# Patient Record
Sex: MALE | Race: WHITE | NOT HISPANIC OR LATINO | Employment: OTHER | URBAN - METROPOLITAN AREA
[De-identification: names, ages, dates, MRNs, and addresses within clinical notes are randomized per-mention and may not be internally consistent; named-entity substitution may affect disease eponyms.]

---

## 2023-11-14 ENCOUNTER — TRANSCRIBE ORDERS (OUTPATIENT)
Dept: ULTRASOUND IMAGING | Facility: HOSPITAL | Age: 78
End: 2023-11-14
Payer: MEDICARE

## 2023-11-14 DIAGNOSIS — E03.9 HYPOTHYROIDISM, UNSPECIFIED TYPE: Primary | ICD-10-CM

## 2023-11-21 ENCOUNTER — HOSPITAL ENCOUNTER (OUTPATIENT)
Dept: ULTRASOUND IMAGING | Facility: HOSPITAL | Age: 78
Discharge: HOME | End: 2023-11-21
Payer: MEDICARE

## 2023-11-21 DIAGNOSIS — E03.9 HYPOTHYROIDISM, UNSPECIFIED TYPE: ICD-10-CM

## 2023-11-21 PROCEDURE — 76536 US EXAM OF HEAD AND NECK: CPT

## 2023-12-12 ENCOUNTER — OFFICE VISIT (OUTPATIENT)
Dept: ORTHOPEDIC SURGERY | Facility: CLINIC | Age: 78
End: 2023-12-12
Payer: MEDICARE

## 2023-12-12 ENCOUNTER — PATIENT ROUNDING (BHMG ONLY) (OUTPATIENT)
Dept: ORTHOPEDIC SURGERY | Facility: CLINIC | Age: 78
End: 2023-12-12
Payer: MEDICARE

## 2023-12-12 VITALS — WEIGHT: 290.3 LBS | TEMPERATURE: 98.6 F | BODY MASS INDEX: 41.56 KG/M2 | HEIGHT: 70 IN

## 2023-12-12 DIAGNOSIS — M17.11 PRIMARY OSTEOARTHRITIS OF RIGHT KNEE: Primary | ICD-10-CM

## 2023-12-12 DIAGNOSIS — E11.9 TYPE 2 DIABETES MELLITUS WITHOUT COMPLICATION, UNSPECIFIED WHETHER LONG TERM INSULIN USE: ICD-10-CM

## 2023-12-12 DIAGNOSIS — M25.561 RIGHT KNEE PAIN, UNSPECIFIED CHRONICITY: ICD-10-CM

## 2023-12-12 RX ORDER — AMLODIPINE BESYLATE AND BENAZEPRIL HYDROCHLORIDE 5; 10 MG/1; MG/1
1 CAPSULE ORAL EVERY MORNING
COMMUNITY
Start: 2023-11-09

## 2023-12-12 RX ORDER — ROSUVASTATIN CALCIUM 10 MG/1
TABLET, COATED ORAL
COMMUNITY
Start: 2023-10-16

## 2023-12-12 RX ORDER — DIPHENOXYLATE HYDROCHLORIDE AND ATROPINE SULFATE 2.5; .025 MG/1; MG/1
TABLET ORAL
COMMUNITY

## 2023-12-12 RX ORDER — LEVOTHYROXINE, LIOTHYRONINE 9.5; 2.25 UG/1; UG/1
1 TABLET ORAL DAILY
COMMUNITY
Start: 2023-11-14

## 2023-12-12 RX ORDER — LIDOCAINE HYDROCHLORIDE 10 MG/ML
2 INJECTION, SOLUTION EPIDURAL; INFILTRATION; INTRACAUDAL; PERINEURAL
Status: COMPLETED | OUTPATIENT
Start: 2023-12-12 | End: 2023-12-12

## 2023-12-12 RX ORDER — MONTELUKAST SODIUM 10 MG/1
10 TABLET ORAL DAILY
COMMUNITY
Start: 2023-11-10

## 2023-12-12 RX ORDER — METHYLPREDNISOLONE ACETATE 80 MG/ML
80 INJECTION, SUSPENSION INTRA-ARTICULAR; INTRALESIONAL; INTRAMUSCULAR; SOFT TISSUE
Status: COMPLETED | OUTPATIENT
Start: 2023-12-12 | End: 2023-12-12

## 2023-12-12 RX ORDER — ACETAMINOPHEN 500 MG
500 TABLET ORAL EVERY 6 HOURS PRN
COMMUNITY

## 2023-12-12 RX ADMIN — LIDOCAINE HYDROCHLORIDE 2 ML: 10 INJECTION, SOLUTION EPIDURAL; INFILTRATION; INTRACAUDAL; PERINEURAL at 11:22

## 2023-12-12 RX ADMIN — METHYLPREDNISOLONE ACETATE 80 MG: 80 INJECTION, SUSPENSION INTRA-ARTICULAR; INTRALESIONAL; INTRAMUSCULAR; SOFT TISSUE at 11:22

## 2023-12-12 NOTE — PROGRESS NOTES
A Performable Message has been sent to the patient for PATIENT ROUNDING with [facility]

## 2023-12-12 NOTE — PROGRESS NOTES
"Patient ID: Fred Grant     Chief Complaint:    Chief Complaint   Patient presents with    Right Knee - Pain, Initial Evaluation        HPI:    Fred Grant is a 78 y.o. who presents today for evaluation of right knee pain.  Patient states 3 weeks ago he was getting out of the car felt he twisted his knee some.  Is having pain medially across the knee and just above and below.  Hurts more with bending.  He is limited on Tylenol and anti-inflammatories he states he is liver disease.    Social History     Socioeconomic History    Marital status:    Tobacco Use    Smoking status: Never     Passive exposure: Never    Smokeless tobacco: Never   Vaping Use    Vaping Use: Never used   Substance and Sexual Activity    Alcohol use: Yes     Alcohol/week: 2.0 standard drinks of alcohol     Types: 2 Glasses of wine per week    Drug use: Never    Sexual activity: Yes     Partners: Female     Birth control/protection: None     Past Medical History:   Diagnosis Date    Hypertension     Type 2 diabetes mellitus      Family History   Problem Relation Age of Onset    Cancer Mother        ROS:    ROS:  Constitutional:  Denies fever, shaking or chills         All other pertinent positives and negatives as noted above in HPI.    Physical Exam:     Vital Signs:  Temp 98.6 °F (37 °C)   Ht 177.8 cm (70\")   Wt 132 kg (290 lb 4.8 oz)   BMI 41.65 kg/m²   Constitutional: Awake alert and oriented x3, well developed, well nourished, no acute distress, non-toxic appearance.    Musculoskeletal:    Exam of the right  knee:  Painful gait with a subtle limp  No muscle atrophy, erythema, ecchymosis, or gross deformity noted  mild knee effusion  + medial> lateral joint line tenderness  Active range of motion 3-115  5/5 strength flexion and extension  The knee is stable to varus and valgus stress testing  Mild varus alignment of the limb  Lachman negative  Posterior drawer negative  Leesa's negative  Patellofemoral grind " +  Sensation grossly intact to light tough throughout the lower extremity  Skin is intact  Distal pulses are 2+  No signs or symptoms of DVT            Diagnostic Studies:     Imaging was personally and individually reviewed and discussed at length with the patient:    4V right knee(s) were taken in the office today, including AP, flexion PA, lateral, and sunrise views to evaluate the patient's complaint:  Weight bearing views show moderate degenerative changes in all three compartments with the medial compartment being most affected.  There is early osteophyte formation throughout all three compartments.  There is no evidence of fracture or dislocation.  No periosteal reactions or medullary lesions are seen.  Patellar height and alignment are within normal limits.     Comparison films not available    AP pelvis was taken in the office today: Mild degenerative changes early bone sclerosis osteophyte formation.            Assessment:     right  Knee Osteoarthritis            Plan:     Based on x-rays, history, and office evaluation, we have diagnosed Fred Grant with knee arthritis. At this time, we recommend starting with a conservative treatment program. This will consist of cortisone injection during significant flare-ups, NSAIDS for daily maintenance, physical therapy for strengthening and modalities as indicated, and bracing when appropriate. These measures will continue, until symptoms are no longer relieved, become more severe or function begins to significantly deteriorate. At that point joint replacement options will be discussed.    Patient will discuss with his primary doctor if he can take any oral anti-inflammatories.  May try some gentle anti-inflammatory medication.  Ice.  Will do an injection today.  Continue to rest and activity modification build back as symptoms improve.  He may also think about trying lateral heel wedge.    Follow up in as needed unless symptoms return or a new issue  occurs.  Patient will call the office to schedule an appointment.     All questions were answered, the patient understands and agrees with the plan.                  Large Joint Arthrocentesis: R knee  Date/Time: 12/12/2023 11:22 AM  Consent given by: patient  Site marked: site marked  Timeout: Immediately prior to procedure a time out was called to verify the correct patient, procedure, equipment, support staff and site/side marked as required   Supporting Documentation  Indications: pain   Procedure Details  Location: knee - R knee  Preparation: Patient was prepped and draped in the usual sterile fashion  Needle gauge: 21 G.  Approach: anterolateral  Medications administered: 2 mL lidocaine PF 1% 1 %; 80 mg methylPREDNISolone acetate 80 MG/ML  Patient tolerance: patient tolerated the procedure well with no immediate complications

## 2024-01-11 ENCOUNTER — OFFICE VISIT (OUTPATIENT)
Dept: SLEEP MEDICINE | Facility: HOSPITAL | Age: 79
End: 2024-01-11
Payer: MEDICARE

## 2024-01-11 VITALS
HEIGHT: 70 IN | OXYGEN SATURATION: 91 % | SYSTOLIC BLOOD PRESSURE: 145 MMHG | HEART RATE: 83 BPM | WEIGHT: 302 LBS | DIASTOLIC BLOOD PRESSURE: 63 MMHG | BODY MASS INDEX: 43.23 KG/M2

## 2024-01-11 DIAGNOSIS — G47.33 OSA TREATED WITH BIPAP: Primary | ICD-10-CM

## 2024-01-11 DIAGNOSIS — E66.01 CLASS 3 SEVERE OBESITY DUE TO EXCESS CALORIES WITHOUT SERIOUS COMORBIDITY WITH BODY MASS INDEX (BMI) OF 40.0 TO 44.9 IN ADULT: ICD-10-CM

## 2024-01-11 PROBLEM — E66.813 CLASS 3 SEVERE OBESITY IN ADULT: Status: ACTIVE | Noted: 2024-01-11

## 2024-01-11 PROCEDURE — G0463 HOSPITAL OUTPT CLINIC VISIT: HCPCS

## 2024-01-11 NOTE — PROGRESS NOTES
[facility]  [address]  [address]  [place] [address]  Phone   Fax       Fred Grant  [id number]   1945  78 y.o.  male      PCP:Janene Daley MD    Type of service: Initial New Patient Office Visit  Date of service: 1/11/2024    Chief Complaint   Patient presents with    Sleep Apnea    Obesity       History of present illness;  Fred Grant 78 y.o.  is a new patient for me and was seen today in event of obstructive sleep apnea.  He has a history of severe sleep apnea and is on BiPAP 17/13.  He had a sleep study in 2009 at [facility].  Unfortunately he has not seen a physician for the past few years.  He is retired.  Without the BIPAP he says he cannot go to sleep.  He also has multiple wakings at night.    I have reviewed the sleep study which was conducted on 15 March  2009 it is a split-night study shows severe sleep apnea with AHI of 58/h and is on a BiPAP of 17/13.    Patient gives the following sleep history.  Sleep schedule:  Bedtime: 10 PM  Wake time: Between 4 and 6 AM  Normally takes about 10-20 minutes to fall asleep        Past medical history: (Relevant to sleep medicine)  Sleep apnea  Hypertension   Diabetes mellitus    Medications are reviewed by me and documented in the encounter  Allergies reviewed and documented in encounter    Social history:  Do you drive a commercial vehicle:  No   Shift work:  No   Tobacco use:  No   Alcohol use:  0 per week  Caffeinated drinks: 2    FAMILY HISTORY (Your mother, father, brothers and sisters) (relevant to sleep medicine)  No history of sleep apnea    REVIEW OF SYSTEMS.  Full review of systems available on the intake form which is scanned in the media tab.  The relevant positive are noted below  Daytime excessive sleepiness with Rose Sleepiness Scale :Total score: 7   Snoring resolved with the BiPAP  Frequent urination      Physical exam:  Vitals:    01/11/24 1406   BP: 145/63  "  Pulse: 83   SpO2: 91%   Weight: (!) 137 kg (302 lb)   Height: 177.8 cm (70\")    Body mass index is 43.33 kg/m². Neck Circumference: 19.5 inches  Nose: no nasal septal defects or deviation and the nasal passages are clear, no nasal polyps,  Throat: tonsils are nonenlarged, tongue normal, oral airway Mallampati class 3  NECK:Neck Circumference: 19.5 inches, trachea is in the midline, thyroid not enlarged  RESPIRATORY SYSTEM: Breath sounds are equal on both sides, there are no wheezes   CARDIOVASULAR SYSTEM: Heart sounds are regular rhythm and bela rate, no edema  EXTREMITES: No cyanosis, clubbing  NEUROLOGICAL SYSTEM: Oriented x 3, no gross motor defects, gait normal    The Smart card downloaded on 1/11/2024 has been independently reviewed by me and discussed the data with the patient. It shows the following..  Compliance; 100%  > 4 hr use, 99%  Average use of the device 5 hours and 56 per night  Residual AHI: 0.8 /hr (normal less than 5)  Mask type: Fullface mask  Device: ResMed       Assessment and plan:  Obstructive sleep apnea ( G 47.33).  The symptoms of sleep apnea have improved with the device and the treatment.  Patient's compliance with the device is excellent for treatment of sleep apnea.  I have independently reviewed the smart card down load and discussed with the patient the download data and encouarged the patient to continue to use the device.The residual AHI is acceptable. The device is benefiting the patient and the device is medically necessary. Without proper control of sleep apnea and good compliance there is a increased risk for hypertension, diabetes mellitus and nonrestorative sleep with hypersomnia which can increase risk for motor vehicle accidents.  Untreated sleep apnea is also a risk factor for development of atrial fibrillation, pulmonary hypertension and stroke.The patient is also instructed to get the supplies from the [employer] and and change them on a regular basis.  Patient is " eligible for a new BiPAP.  I have sent a order to [facility] to get him a new BiPAP 17/13.  He is going to see me in 31 to 90 days for follow-up on compliance.  The BiPAP is medically necessary and it is benefiting the patient  Obesity 3, with BMI Body mass index is 43.33 kg/m².. I have discussed the relationship between weight and sleep apnea.There is direct correlation between weight and severity of sleep apnea.  Weight reduction is encouraged, as it is going to reduce the severity of sleep apnea. I have also discussed with the patient diet and exercise to achieve ideal body weight  Hypertension  Diabetes mellitus,     I have also discussed with the patient the following  Sleep hygiene: Maintaining a regular bedtime and wake time, not to watch television or work in bed, limit caffeine-containing beverages before bed time and avoid naps during the day  Adequate amount of sleep.  Generally most people needs about 7 to 8 hours of sleep.  Return for 31 to 90 days after PAP setup with down load..  Patient's questions were answered.      1/11/2024  Selwyn Barfield MD  Sleep Medicine  Medical Director  [facility] [place] [facility] [place] [facility]

## 2024-02-09 ENCOUNTER — HOSPITAL ENCOUNTER (EMERGENCY)
Facility: HOSPITAL | Age: 79
Discharge: HOME | End: 2024-02-09
Attending: EMERGENCY MEDICINE
Payer: MEDICARE

## 2024-02-09 ENCOUNTER — APPOINTMENT (OUTPATIENT)
Dept: CT IMAGING | Facility: HOSPITAL | Age: 79
End: 2024-02-09
Payer: MEDICARE

## 2024-02-09 VITALS
HEIGHT: 70 IN | TEMPERATURE: 97.9 F | HEART RATE: 71 BPM | OXYGEN SATURATION: 99 % | WEIGHT: 302 LBS | SYSTOLIC BLOOD PRESSURE: 136 MMHG | DIASTOLIC BLOOD PRESSURE: 64 MMHG | RESPIRATION RATE: 18 BRPM | BODY MASS INDEX: 43.23 KG/M2

## 2024-02-09 DIAGNOSIS — R22.0 FACIAL SWELLING: Primary | ICD-10-CM

## 2024-02-09 LAB
ANION GAP SERPL CALCULATED.3IONS-SCNC: 8.7 MMOL/L (ref 5–15)
BASOPHILS # BLD AUTO: 0.04 10*3/MM3 (ref 0–0.2)
BASOPHILS NFR BLD AUTO: 0.4 % (ref 0–1.5)
BUN SERPL-MCNC: 18 MG/DL (ref 8–23)
BUN/CREAT SERPL: 21.7 (ref 7–25)
CALCIUM SPEC-SCNC: 10.6 MG/DL (ref 8.6–10.5)
CHLORIDE SERPL-SCNC: 105 MMOL/L (ref 98–107)
CO2 SERPL-SCNC: 25.3 MMOL/L (ref 22–29)
CREAT SERPL-MCNC: 0.83 MG/DL (ref 0.76–1.27)
DEPRECATED RDW RBC AUTO: 49.4 FL (ref 37–54)
EGFRCR SERPLBLD CKD-EPI 2021: 90.7 ML/MIN/1.73
EOSINOPHIL # BLD AUTO: 0.19 10*3/MM3 (ref 0–0.4)
EOSINOPHIL NFR BLD AUTO: 1.8 % (ref 0.3–6.2)
ERYTHROCYTE [DISTWIDTH] IN BLOOD BY AUTOMATED COUNT: 13.3 % (ref 12.3–15.4)
GLUCOSE SERPL-MCNC: 92 MG/DL (ref 65–99)
HCT VFR BLD AUTO: 48.6 % (ref 37.5–51)
HGB BLD-MCNC: 16.2 G/DL (ref 13–17.7)
IMM GRANULOCYTES # BLD AUTO: 0.03 10*3/MM3 (ref 0–0.05)
IMM GRANULOCYTES NFR BLD AUTO: 0.3 % (ref 0–0.5)
LYMPHOCYTES # BLD AUTO: 2.64 10*3/MM3 (ref 0.7–3.1)
LYMPHOCYTES NFR BLD AUTO: 24.8 % (ref 19.6–45.3)
MCH RBC QN AUTO: 32.9 PG (ref 26.6–33)
MCHC RBC AUTO-ENTMCNC: 33.3 G/DL (ref 31.5–35.7)
MCV RBC AUTO: 98.6 FL (ref 79–97)
MONOCYTES # BLD AUTO: 1.13 10*3/MM3 (ref 0.1–0.9)
MONOCYTES NFR BLD AUTO: 10.6 % (ref 5–12)
NEUTROPHILS NFR BLD AUTO: 6.6 10*3/MM3 (ref 1.7–7)
NEUTROPHILS NFR BLD AUTO: 62.1 % (ref 42.7–76)
PLATELET # BLD AUTO: 148 10*3/MM3 (ref 140–450)
PMV BLD AUTO: 10.7 FL (ref 6–12)
POTASSIUM SERPL-SCNC: 4.6 MMOL/L (ref 3.5–5.2)
RBC # BLD AUTO: 4.93 10*6/MM3 (ref 4.14–5.8)
SODIUM SERPL-SCNC: 139 MMOL/L (ref 136–145)
WBC NRBC COR # BLD AUTO: 10.63 10*3/MM3 (ref 3.4–10.8)

## 2024-02-09 PROCEDURE — 25510000001 IOPAMIDOL PER 1 ML: Performed by: EMERGENCY MEDICINE

## 2024-02-09 PROCEDURE — 99284 EMERGENCY DEPT VISIT MOD MDM: CPT | Performed by: NURSE PRACTITIONER

## 2024-02-09 PROCEDURE — 80048 BASIC METABOLIC PNL TOTAL CA: CPT | Performed by: NURSE PRACTITIONER

## 2024-02-09 PROCEDURE — 99285 EMERGENCY DEPT VISIT HI MDM: CPT

## 2024-02-09 PROCEDURE — 96365 THER/PROPH/DIAG IV INF INIT: CPT

## 2024-02-09 PROCEDURE — 85025 COMPLETE CBC W/AUTO DIFF WBC: CPT | Performed by: NURSE PRACTITIONER

## 2024-02-09 PROCEDURE — 25010000002 CEFTRIAXONE PER 250 MG: Performed by: NURSE PRACTITIONER

## 2024-02-09 PROCEDURE — 70487 CT MAXILLOFACIAL W/DYE: CPT

## 2024-02-09 PROCEDURE — 63710000001 PREDNISONE PER 1 MG: Performed by: NURSE PRACTITIONER

## 2024-02-09 RX ORDER — METHYLPREDNISOLONE 4 MG/1
TABLET ORAL
Qty: 21 TABLET | Refills: 0 | Status: SHIPPED | OUTPATIENT
Start: 2024-02-09

## 2024-02-09 RX ORDER — PREDNISONE 20 MG/1
40 TABLET ORAL ONCE
Status: COMPLETED | OUTPATIENT
Start: 2024-02-09 | End: 2024-02-09

## 2024-02-09 RX ADMIN — CEFTRIAXONE SODIUM 1000 MG: 1 INJECTION, POWDER, FOR SOLUTION INTRAMUSCULAR; INTRAVENOUS at 18:03

## 2024-02-09 RX ADMIN — IOPAMIDOL 100 ML: 755 INJECTION, SOLUTION INTRAVENOUS at 19:28

## 2024-02-09 RX ADMIN — PREDNISONE 40 MG: 20 TABLET ORAL at 20:42

## 2024-02-09 NOTE — FSED PROVIDER NOTE
Subjective   History of Present Illness  Patient is a 78-year-old male who presents complaining of left-sided facial swelling.  States that started Tuesday or Wednesday.  States he did see his PCP who thought it might have been a swollen parotid gland.  Patient states his PCP sent him over here for further workup as she was nervous about giving him Augmentin with a comorbidity of nonalcoholic liver disease.  Patient reports subjective fever at home earlier this week but did not check it.  Denies facial redness.      Review of Systems   HENT:          Facial swelling   All other systems reviewed and are negative.      Past Medical History:   Diagnosis Date    Hypertension     Type 2 diabetes mellitus        No Known Allergies    History reviewed. No pertinent surgical history.    Family History   Problem Relation Age of Onset    Cancer Mother        Social History     Socioeconomic History    Marital status:    Tobacco Use    Smoking status: Never     Passive exposure: Never    Smokeless tobacco: Never   Vaping Use    Vaping Use: Never used   Substance and Sexual Activity    Alcohol use: Yes     Alcohol/week: 2.0 standard drinks of alcohol     Types: 2 Glasses of wine per week    Drug use: Never    Sexual activity: Yes     Partners: Female     Birth control/protection: None           Objective   Physical Exam  Vitals and nursing note reviewed.   Constitutional:       Appearance: Normal appearance.   HENT:      Head: Normocephalic and atraumatic.      Mouth/Throat:      Comments: Left sided parotid swelling, no facial redness  Pulmonary:      Effort: Pulmonary effort is normal.   Musculoskeletal:      Cervical back: Normal range of motion and neck supple.   Skin:     General: Skin is warm and dry.      Capillary Refill: Capillary refill takes less than 2 seconds.   Neurological:      General: No focal deficit present.      Mental Status: He is alert and oriented to person, place, and time.          Procedures           ED Course                                           Medical Decision Making  Laboratory findings reassuring, facial CT unremarkable.  No parotid abscess, unremarkable in appearance.  No facial cellulitis.  Will trial course of steroids for inflammation, he is follow-up with his dentist and primary care as scheduled.  He was given strict return precautions.      Amount and/or Complexity of Data Reviewed  Labs: ordered.  Radiology: ordered.    Risk  Prescription drug management.        Final diagnoses:   Facial swelling       ED Disposition  ED Disposition       ED Disposition   Discharge    Condition   Stable    Comment   --               Janene Daley MD  [address] [place] [id number] [phone]      If symptoms worsen         Medication List        New Prescriptions      methylPREDNISolone 4 MG dose pack  Commonly known as: MEDROL  Take as directed on package instructions.               Where to Get Your Medications        These medications were sent to [facility] [address] [place], [place] - [address] [place] [address] - [phone]  - [phone] [address] [address]      Phone: [phone]   methylPREDNISolone 4 MG dose pack

## 2024-02-10 NOTE — ED NOTES
Patient came back out the nurses station and refused to go back in his room. Pt made this RN administer his oral prednisone at the nurses station. Patient given D/C papers at the nurses station. All questions answered and patient left ED with spouse with steady gait.

## 2024-02-10 NOTE — ED NOTES
This RN offered patient a warm blanket d/t him stating that his room was cold. Patient declined. Patient stated that he had already been given a blanket and it did not help.

## 2024-02-10 NOTE — ED NOTES
"Patient came out to the nurses station stating that he was very frustrated with his room being 67 degrees and that he was upset about the wait time. This RN informed patient that we were just waiting on the CT results and that we had no control over how long the Radiologist took to read the images. This RN talked to the rad tech who pulled up the exam and stated that the Radiologist was actively reading the images. Patient still raising his voice in the chiang and voicing his frustrations. MD Smith intervened and assisted patient back to his room. Patient standing in the doorway stating that he is also upset that he still has his IV in his arm. MD informed patient that this is in case he needs further treatment based on his CT results. This RN offered to remove IV and patient declined at this time. MD reiterated to patient the importance of staying in his room in accordance to the \"wandering patient\" form he signed upon arrival to the department to help ensure other patient's privacy. Patient given contact information for [facility] Patient Representative Coordinator to follow-up on his complaints.   "

## 2025-06-10 ENCOUNTER — TRANSCRIBE ORDERS (OUTPATIENT)
Age: 80
End: 2025-06-10
Payer: MEDICARE

## 2025-06-10 DIAGNOSIS — I73.9 PVD (PERIPHERAL VASCULAR DISEASE): Primary | ICD-10-CM

## 2025-07-17 ENCOUNTER — OFFICE VISIT (OUTPATIENT)
Age: 80
End: 2025-07-17
Payer: MEDICARE

## 2025-07-17 VITALS
RESPIRATION RATE: 17 BRPM | SYSTOLIC BLOOD PRESSURE: 110 MMHG | HEART RATE: 66 BPM | HEIGHT: 70 IN | BODY MASS INDEX: 43.23 KG/M2 | WEIGHT: 302 LBS | DIASTOLIC BLOOD PRESSURE: 65 MMHG

## 2025-07-17 DIAGNOSIS — I87.8 VENOUS STASIS: ICD-10-CM

## 2025-07-17 DIAGNOSIS — I73.9 PVD (PERIPHERAL VASCULAR DISEASE): Primary | ICD-10-CM

## 2025-07-17 DIAGNOSIS — I87.2 VENOUS (PERIPHERAL) INSUFFICIENCY: ICD-10-CM

## 2025-07-17 DIAGNOSIS — I87.323 CHRONIC VENOUS HYPERTENSION WITH INFLAMMATION INVOLVING BOTH SIDES: ICD-10-CM

## 2025-07-17 RX ORDER — THYROID 30 MG/1
30 TABLET ORAL DAILY
COMMUNITY

## 2025-07-17 NOTE — PROGRESS NOTES
Patient Name: Fred Grant    MRN: [id number] Encounter Date: 07/17/2025      Consulting Service: Vascular Surgery    Referring Provider: SHAUN Herrera       CHIEF COMPLAINT:  Chief Complaint   Patient presents with    Peripheral Vascular Disease       Subjective    HPI: Fred Grant is a 80 y.o. male being seen for evaluation/management of complaints of venous insufficiency and lower extremity edema of bilateral lower extremity.   Symptoms include Kent symptoms: Edema/Swelling, Itching, Rash/Irritation, Pigmentation, and Throbbing.  Patient describes the discomfort from the veins as affecting their daily life.   Patient has positive family history of the varicose veins and negative family history of DVT.  At this point in time attempts at symptomatic control using elevation, compression and nonsteroidals have not been attempted.  Prior venous interventions include: none.                                                                                                                                                                                                                                                     Patient is also being seen for evaluation/management of peripheral vascular disease.  Patient presents with complaints of leg pain and edema.  Current complaints consistent with claudication at no distance is present.  Currently there symptoms are not lifestyle limiting in nature.  Patient denies symptoms consistent with rest pain.  Based on our discussion my suspicion is that the patient has possible other sources of leg pain such as neurogenic claudication.  Workup still will be pursued with both ARELI and lower extremity venous evaluation.    PAST MEDICAL HISTORY:   Past Medical History:   Diagnosis Date    Hypertension     Type 2 diabetes mellitus       PAST SURGICAL HISTORY: History reviewed. No pertinent surgical history.   FAMILY HISTORY:   Family History   Problem Relation Age of  "Onset    Cancer Mother       SOCIAL HISTORY:   Social History     Tobacco Use    Smoking status: Never     Passive exposure: Never    Smokeless tobacco: Never   Vaping Use    Vaping status: Never Used   Substance Use Topics    Alcohol use: Yes     Alcohol/week: 2.0 standard drinks of alcohol     Types: 2 Glasses of wine per week    Drug use: Never      MEDICATIONS:   Current Outpatient Medications on File Prior to Visit   Medication Sig Dispense Refill    amLODIPine-benazepril (LOTREL 5-10) 5-10 MG per capsule Take 1 capsule by mouth Every Morning.      Garlic (GARLIQUE PO) Take  by mouth.      MAGNESIUM CITRATE PO Take  by mouth.      metFORMIN (GLUCOPHAGE) 1000 MG tablet Take 1 tablet by mouth Every 12 (Twelve) Hours.      montelukast (SINGULAIR) 10 MG tablet Take 1 tablet by mouth Daily. as directed      multivitamin (THERAGRAN) tablet tablet Take  by mouth.      Thyroid (NP THYROID) 30 MG PO tablet Take 1 tablet by mouth Daily.      Berberine Chloride (BERBERINE HCI PO) Take  by mouth. (Patient not taking: Reported on 7/17/2025)      methylPREDNISolone (MEDROL) 4 MG dose pack Take as directed on package instructions. (Patient not taking: Reported on 7/17/2025) 21 tablet 0    NP Thyroid 15 MG tablet Take 1 tablet by mouth Daily. (Patient not taking: Reported on 7/17/2025)      rosuvastatin (CRESTOR) 10 MG tablet  (Patient not taking: Reported on 7/17/2025)       No current facility-administered medications on file prior to visit.       ALLERGIES: Patient has no known allergies.       Objective   Vitals:    07/17/25 1002   BP: 110/65   BP Location: Right arm   Patient Position: Sitting   Cuff Size: Large Adult   Pulse: 66   Resp: 17   Weight: (!) 137 kg (302 lb)   Height: 177 cm (69.69\")     Body mass index is 43.73 kg/m².  Class 3 Severe Obesity (BMI >=40). Obesity-related health conditions include the following: lower extremity venous stasis disease. Obesity is improving with treatment. BMI is is above average; " no BMI management plan is appropriate. We discussed portion control, increasing exercise, and Information on healthy weight added to patient's after visit summary.      PHYSICAL EXAM:   Physical Exam  Constitutional:       Appearance: Normal appearance.   HENT:      Head: Normocephalic and atraumatic.      Nose: Nose normal.   Eyes:      Extraocular Movements: Extraocular movements intact.      Pupils: Pupils are equal, round, and reactive to light.   Cardiovascular:      Rate and Rhythm: Normal rate.      Pulses: Normal pulses.      Heart sounds: Normal heart sounds.   Pulmonary:      Effort: Pulmonary effort is normal.      Breath sounds: Normal breath sounds.   Abdominal:      General: Abdomen is flat. Bowel sounds are normal.      Palpations: Abdomen is soft.   Musculoskeletal:         General: Normal range of motion.      Cervical back: Normal range of motion and neck supple.      Right lower le+ Edema present.      Left lower le+ Edema present.   Skin:     General: Skin is warm and dry.   Neurological:      General: No focal deficit present.      Mental Status: He is alert and oriented to person, place, and time. Mental status is at baseline.   Psychiatric:         Mood and Affect: Mood normal.         Thought Content: Thought content normal.          Result Review   LABS:                   Results Review:       I reviewed the patient's new clinical results.    The following radiologic or non-invasive studies have been reviewed by me: none  No results found for this or any previous visit from the past 365 days.     No radiology results for the last 30 days.                ASSESSMENT/PLAN:   Diagnoses and all orders for this visit:    1. PVD (peripheral vascular disease) (Primary)  -     Doppler Ankle Brachial Index Single Level CAR; Future  -     Cancel: Duplex Venous Lower Extremity - Bilateral CAR; Future  -     Duplex Venous Lower Extremity - Bilateral CAR; Future    2. Chronic venous hypertension  with inflammation involving both sides  -     Doppler Ankle Brachial Index Single Level CAR; Future  -     Cancel: Duplex Venous Lower Extremity - Bilateral CAR; Future  -     Duplex Venous Lower Extremity - Bilateral CAR; Future    3. Venous (peripheral) insufficiency  -     Doppler Ankle Brachial Index Single Level CAR; Future  -     Cancel: Duplex Venous Lower Extremity - Bilateral CAR; Future  -     Duplex Venous Lower Extremity - Bilateral CAR; Future    4. Venous stasis  -     Doppler Ankle Brachial Index Single Level CAR; Future  -     Cancel: Duplex Venous Lower Extremity - Bilateral CAR; Future  -     Duplex Venous Lower Extremity - Bilateral CAR; Future       80 y.o. male with lower extremity edema more than pain or any type of claudication.  In fact he walks as far as he wants and I do not believe that his complaints are consistent with peripheral vascular disease but much more consistent with venous insufficiency.  Given the stasis changes along the gaiter distribution of his shins the warmth and inflammation along the areas where veins likely are sitting and the fact that he is swelling which is worse on the left than right over time it is likely pointing towards the venous insufficiency diagnosis.  We are going to get him into a pair of knee-high 20 to 30 mm torr compression stockings and set him up for reflux scan to confirm reflux as well as to rule out DVT.  We will check an ARELI to be completed but I feel good pulses and do not think that this is arterial in nature.  Will not pursue formal mapping's unless we feel that his legs would really benefit from ablation and that point we would proceed to the completion mapping is not needed.  I believe he will do very well with the knee-high compression stockings and the knowledge that he is not sitting on the arterial issues that his mother had.    I discussed the plan with the patient and family who are agreeable to the plan of care at this point. Thank  you for this consult.   Follow Up  Return in about 3 months (around 10/17/2025).    Hubert Kent MD   07/17/25